# Patient Record
(demographics unavailable — no encounter records)

---

## 2024-10-11 NOTE — ASSESSMENT
[FreeTextEntry1] : Ms. NOEMI BOLAÑOS is a 66 year F with pain in the lower back across with radiation down the buttock She reports chronic long standing pain and is noting an acute on chronic exacerbation of this pain due to lumbar radiculopathy. However, her predominant pain is across the lower back due to sacroiliac joint pain. There ARE +myelopathic signs on today's exam.  Patient reassured and educated on the diagnosis and treatment options. Risks and benefits of treatment and of delaying treatment discussed with patient. Risks discussed include but not limited to: progression of symptoms, worsening pain and functional status, etc.  This note was generated using Dragon medical dictation software. A reasonable effort had been made for proofreading its contents, but spelling mistakes or grammatical errors may still remain. If there are any questions or points of clarification needed please notify my office.  Dr. Valdes's note reviewed in HPI MRI L spine from 2023 reviewed  Spine surgery evaluation due to myelopathic signs on physical exam and extensive falling history  Patient is being sent and I am requesting authorization for MRI w/o contrast of CERVICAL AND LUMBAR SPINE to evaluate for nerve compression, stenosis, degenerative disease, soft tissue injury or other new or worsening etiology of pain. Patient has myelopathic signs on physical exam and extensive falling history  Patient had tried and failed conservative treatment. This includes but is not limited to: Acetaminophen, NSAIDs, muscle relaxants, neuropathic medications, physician directed home exercises and/or physical therapy for at least 8 weeks. After a thorough discussion of risks and benefits patient would like to proceed with BILATERAL SACROILIAC JOINT STEROID INJECTION WITH FLUOROSCOPIC GUIDANCE  Risks and benefits of having injection discussed with patient. Risks discussed included, but not limited to: pain, infection, bleeding requiring emergency surgery, headaches, damage to vital organs, etc.  At this time, patient appears to be psychologically stable. Based on the history, physical exam and diagnostic findings, patient will benefit from this procedure. The goal of this procedure is to reduce pain and inflammation, improve function and range of motion and to further promote increased activity and return to previous level of functioning. The ultimate goal of performing this procedure is to improve patient's quality of life.  Asking for authorization for BILATERAL SACROILIAC JOINT STEROID INJECTION WITH FLUOROSCOPIC GUIDANCE to help treat patients pain.  Patient will be scheduled for this procedure.

## 2024-10-11 NOTE — HISTORY OF PRESENT ILLNESS
[FreeTextEntry1] : NOEMI BOLAÑOS is an 66 year old F here for initial evaluation of BACK PAIN. Ms. BOLAÑOS was being treated by Dr. Valdes and was planning to have a lumbar CHEL. However, patient preferred to be treated in the afternoon and reports that this is not offered to her. She is here today expecting to get an epidural.  From Dr. Valdes's note on 9/10/24: "Patient returns after the first LESI a few weeks ago. The patient reports no significant improvement in the symptoms. The patient returns for a follow up visit today.....66 year old female with low back pain and lumbar radicular pain not significantly improved following her first LESI. We did discuss a repeat lumbar epidural steroid injection"  NOEMI BOLAÑOS reports an increased frequency of falling over the past few months. She notes having a fall at least once a week. Denies any LOC or recent injuries, but does note a "bruise on the LEFT knee." She feels like her legs give out or she stumbles and trips.  Here PCP and all her usual physicians are at [facility], where she had MRI L spine done a year ago. Today reporting majority of pain across the lower back. She is annoyed that she was requested to come in for evaluation with me in the office. She wanted for me to do just her epidural injection. She is a retired .   Pain location: lower back Quality: sharp, stabbing Radiation: across the lower back and down the buttocks Severity: 10/10 on NRS Onset: over 3 months ago Associated symptoms: stiffness, spasms Numbness: denies Weakness: admits to high levels of falling Exacerbated by: walking, standing Improved by: nothing Bowel or bladder involvement: denies   The pain interferes with function, ADLs and quality of life.   Denies bowel/bladder dysfunction, saddle anesthesia, fevers, chills, weight loss, night pain, or night sweats at this time.  Patient had tried Acetaminophen, NSAIDs, prescription pain medications, muscle relaxants without any lasting relief of pain.   Patient had imaging studies to evaluate the pain. Patient had tried physical therapy, and/or physician directed home exercises, stretching, lifestyle modification for over 8 weeks without any significant relief.

## 2024-10-11 NOTE — PHYSICAL EXAM
[FreeTextEntry1] : General exam   Constitutional: The patient appears well-developed, well-nourished, and in no apparent distress. Patient is well-groomed.    Skin: The skin is warm and dry, with normal turgor.  Eyes: PERRL.    ENMT: Ears: Hearing is grossly within normal limits.    Neck: Supple: The neck is supple.    Respiratory: Inspection: Breathing unlabored.    Neurologic: Alert and oriented x 3.   Psychiatric: Patient is cooperative and appropriate.  Mood and affect are normal.  Patient's insight is good, and memory and judgment are intact.  CERVICAL EXAM  APPEARANCE: Flexed forward posture  Arthritic appearing hand nodules  REFLEXES: Pitt sign left + Pitt sign right +  Biceps (C5) left 3+ Biceps (C5) right 3+  LUMBAR EXAM   APPEARANCE: No visible scars No gross deformity or malalignment No erythema, swelling or ecchymosis Decreased lumbar lordosis +muscle atrophy of the left lower extremity +muscle atrophy of the right lower extremity   TENDERNESS: Multiple trigger points over bilateral lumbar paraspinal muscles neg over midline spinous processes + over left lumbar facet joints + over right lumbar facet joints + over left lumbar paraspinal muscles: erector spinae and quadratus lumborum + over right lumbar paraspinal muscles: erector spinae and quadratus lumborum + over left sacroiliac joint + over right sacroiliac joint   ROM: Pain limited A/PROM of the lumbar spine + pain with flexion, extension of the lumbar spine + pain with left side bending + pain with right side bending + pain with left rotation + pain with right rotation + hamstring tightness on the left + hamstring tightness on the right   SPECIAL TESTS: + left straight leg raising test + right straight leg raising test FABERE left + FABERE right + FADIR left + FADIR right + +Distraction b/l   SENSORY TESTING: Intact to light touch Left L1-S2 : Intact to light touch Right L1-S2 :   MOTOR TESTING: Muscle tone of the left lower extremity is normal Muscle tone of the right lower extremity is normal   Left hip flexion strength is 5/5 Right hip flexion strength is 5/5   Left quadriceps strength is 5/5 Right quadriceps strength is 5/5   Left hamstrings strength is 5/5 Right hamstrings strength is 5/5   Left EHL strength is 5/5 Right EHL strength is 5/5   Left ankle dorsiflexion strength is 5/5 Right ankle dorsiflexion strength is 5/5   Left ankle plantar flexion strength is 5/5 Right ankle plantar flexion strength is 5/5   REFLEXES: Patella (L4) left 3+ Patella (L4) right 3+ Left knee with infrapatellar ecchymosis, resolving and minor scrape   GAIT: +antalgic gait able to walk on toes able to walk on heels Flexed forward posture

## 2024-10-11 NOTE — HISTORY OF PRESENT ILLNESS
[FreeTextEntry1] : NOEMI BOLAÑOS is an 66 year old F here for initial evaluation of BACK PAIN. Ms. BOLAÑOS was being treated by Dr. Valdes and was planning to have a lumbar CHEL. However, patient preferred to be treated in the afternoon and reports that this is not offered to her. She is here today expecting to get an epidural.  From Dr. Valdes's note on 9/10/24: "Patient returns after the first LESI a few weeks ago. The patient reports no significant improvement in the symptoms. The patient returns for a follow up visit today.....66 year old female with low back pain and lumbar radicular pain not significantly improved following her first LESI. We did discuss a repeat lumbar epidural steroid injection"  NOEMI BOLAÑOS reports an increased frequency of falling over the past few months. She notes having a fall at least once a week. Denies any LOC or recent injuries, but does note a "bruise on the LEFT knee." She feels like her legs give out or she stumbles and trips.  Here PCP and all her usual physicians are at [facility], where she had MRI L spine done a year ago. Today reporting majority of pain across the lower back. She is annoyed that she was requested to come in for evaluation with me in the office. She wanted for me to do just her epidural injection. She is a retired .   Pain location: lower back Quality: sharp, stabbing Radiation: across the lower back and down the buttocks Severity: 10/10 on NRS Onset: over 3 months ago Associated symptoms: stiffness, spasms Numbness: denies Weakness: admits to high levels of falling Exacerbated by: walking, standing Improved by: nothing Bowel or bladder involvement: denies   The pain interferes with function, ADLs and quality of life.   Denies bowel/bladder dysfunction, saddle anesthesia, fevers, chills, weight loss, night pain, or night sweats at this time.  Patient had tried Acetaminophen, NSAIDs, prescription pain medications, muscle relaxants without any lasting relief of pain.   Patient had imaging studies to evaluate the pain. Patient had tried physical therapy, and/or physician directed home exercises, stretching, lifestyle modification for over 8 weeks without any significant relief.

## 2024-10-25 NOTE — PROCEDURE
[de-identified] : [facility] [address], [place], [address] - [phone]   PATIENT: NOEMI BOLAÑOS MEDICAL RECORD: [id number] DATE OF PROCEDURE: 10/25/2024   PHYSICIAN: Nasim Del Toro DO  BILATERAL SACROILIAC JOINT INTRAARTICULAR STEROID INJECTION WITH FLUOROSCOPIC GUIDANCE   Injectate: 80mg/mL methylPrednisolone and 0.25% Bupivacaine 4mL   Complications: None   Estimated Blood Loss: None   SACROILIAC JOINT INTRAARTICULAR STEROID INJECTION WITH FLUOROSCOPIC GUIDANCE   Technique: After informed consent was obtained, patient was taken to the operating room and placed on the table in the prone position. All pressure points were supported. The lumbosacral area was then exposed and prepped with betadine x3 followed by chlorhexidine and draped in a standard sterile manner. The entirety of the procedure was done under sterile technique using sterile gloves, surgical mask and cap and all medication expiration dates were verified prior to being drawn into syringes.   Utilizing AP fluoroscopy with a slight cephalad tilt and contralateral oblique projection. The right sacroiliac joint was visualized. Following this, 3 mL of preservative free 1% lidocaine was inserted for a skin wheal overlying the inferior aspect of the joint. Following this, a 22G 3-1/2 inch spinal needle was injected through the skin wheal until engagement with inferior articular surface. 1 mL Omnipaque 240 contrast was then injected to delineate articular spread.   After negative aspiration for heme, the entirety of the above mentioned injectate was then injected to the right sacroiliac joint.   The procedure was repeated on LEFT SIJ   The patient tolerated the entirety of the procedure well and was taken to phase 2 recovery in stable condition with bilateral lower extremity motor and sensory intact.

## 2024-12-12 NOTE — ASSESSMENT
[FreeTextEntry1] : Ms. NOEMI BOLAÑOS is a 66 year F with pain in the lower back across with radiation down the buttock due to lumbar radiculopathy. There ARE +myelopathic signs on exam.  Patient reassured and educated on the diagnosis and treatment options. Risks and benefits of treatment and of delaying treatment discussed with patient. Risks discussed include but not limited to: progression of symptoms, worsening pain and functional status, etc.  This note was generated using Dragon medical dictation software. A reasonable effort had been made for proofreading its contents, but spelling mistakes or grammatical errors may still remain. If there are any questions or points of clarification needed please notify my office.  MRI L spine from 2023 reviewed  Spine surgery evaluation due to myelopathic signs on physical exam and extensive falling history  Patient is being sent and I am requesting authorization for MRI w/o contrast of CERVICAL AND LUMBAR SPINE to evaluate for nerve compression, stenosis, degenerative disease, soft tissue injury or other new or worsening etiology of pain. Patient has myelopathic signs on physical exam and extensive falling history  Patient had tried and failed conservative treatment. This includes but is not limited to: Acetaminophen, NSAIDs, muscle relaxants, neuropathic medications, physician directed home exercises and/or physical therapy for at least 8 weeks. After a thorough discussion of risks and benefits patient would like to proceed with LUMBAR INTERLAMINAR EPIDURAL STEROID INJECTION AT LEFT L5-S1 WITH FLUOROSCOPIC GUIDANCE  Risks and benefits of having injection discussed with patient. Risks discussed included, but not limited to: pain, infection, bleeding requiring emergency surgery, headaches, damage to vital organs, etc.  At this time, patient appears to be psychologically stable. Based on the history, physical exam and diagnostic findings, patient will benefit from this procedure. The goal of this procedure is to reduce pain and inflammation, improve function and range of motion and to further promote increased activity and return to previous level of functioning. The ultimate goal of performing this procedure is to improve patient's quality of life.  Asking for authorization for LUMBAR INTERLAMINAR EPIDURAL STEROID INJECTION AT LEFT L5-S1 WITH FLUOROSCOPIC GUIDANCE to help treat patients pain. Patient will be scheduled for this procedure.

## 2024-12-12 NOTE — PHYSICAL EXAM
[FreeTextEntry1] : General exam   Constitutional: The patient appears well-developed, well-nourished, and in no apparent distress. Patient is well-groomed.    Skin: The skin is warm and dry, with normal turgor.  Eyes: PERRL.    ENMT: Ears: Hearing is grossly within normal limits.    Neck: Supple: The neck is supple.    Respiratory: Inspection: Breathing unlabored.    Neurologic: Alert and oriented x 3.   Psychiatric: Patient is cooperative and appropriate.  Mood and affect are normal.  Patient's insight is good, and memory and judgment are intact.  Lumbar Skin c/d/i without any erythema, swelling, effusion  Diffuse tenderness Multiple trigger points SLR + b/l FABERE/FADIR + on the LEFT Antalgic, flexed forward gait Motor grossly intact

## 2024-12-12 NOTE — HISTORY OF PRESENT ILLNESS
[FreeTextEntry1] : NOEMI BOLAÑOS had BILATERAL SACROILIAC JOINT INTRAARTICULAR STEROID INJECTION WITH FLUOROSCOPIC GUIDANCE on 10/25/24. Today patient is here for follow up. Patient reports no relief of her pains. Pain is 10/10 on NRS at this time. It is across the lower back, worse on the LEFT and travel down the buttocks and thigh. He had not done an updated MRI C and L spine yet.   Denies any new pain, numbness or weakness, bowel/bladder dysfunction, saddle anesthesia, fevers, chills, weight loss, night pain, or night sweats at this time.

## 2025-01-06 NOTE — DISCUSSION/SUMMARY
[Medication Risks Reviewed] : Medication risks reviewed [Surgical risks reviewed] : Surgical risks reviewed [de-identified] : L4-5 spondylolisthesis with moderate stenosis. Cervical degenerative disc disease. Discussed all options. Will see Dr. Del Toro for pain management.  F/U 4 weeks. Discussed L4-5 laminectomy and fusion. Risks of surgery include infection, dural tear, nerve root injury, reherniation, future leg pain, future back pain, retained fragment, hematoma, urinary retention, worsening leg symptoms, foot drop, anesthetic risks, blood transfusion risks, positioning pain, visceral vascular injury, deep vein thrombosis, pulmonary embolus, and death. Somatosensory evoked potentials and EMG monitoring will be used. Surgery will involve lumbar decompression and fusion with or without instrumentation, local auto bone fusion, demineralized bone matrix, and neural monitoring. Patient will need spinal orthosis pre and post operatively. All risks were explained not exclusive to the ones mentioned alternatives were discussed and all questions were answered the patient agrees and understands the above and is in complete agreement with the plan. All options discussed including rest, medicine, home exercise, acupuncture, Chiropractic care, Physical Therapy, Pain management, and last resort surgery. All questions were answered, all alternatives discussed, and the patient is in complete agreement with the treatment plan which the patient contributed to and discussed with me through the shared decision-making process. Follow-up appointment as instructed. Any issues and the patient will call or come in sooner.

## 2025-01-06 NOTE — PHYSICAL EXAM
[Normal] : Gait: normal [Pitt's Sign] : negative Pitt's sign [Pronator Drift] : negative pronator drift [SLR] : negative straight leg raise [de-identified] : 5 out of 5 motor strength, sensation is intact and symmetrical full range of motion flexion extension and rotation, no palpatory tenderness full range of motion of hips knees shoulders and elbows (all four extremities), no atrophy, negative straight leg raise, no pathological reflexes, no swelling, normal ambulation, no apparent distress skin is intact, no palpable lymph nodes, no upper or lower extremity instability, alert and oriented x3 and normal mood. Normal finger-to nose test.  No upper motor neuron findings. [de-identified] : I reviewed, interpreted and clinically correlated the following outside imaging studies, EXAM: [id number] - MR SPINE LUMBAR  - ORDERED BY: THEODORE KILGORE   PROCEDURE DATE:  01/02/2025    INTERPRETATION:  CLINICAL INFORMATION: Low back pain  ADDITIONAL CLINICAL INFORMATION: Not Applicable  TECHNIQUE: Multiplanar, multisequence MRI was performed of the lumbar spine. IV Contrast: NONE  PRIOR STUDIES: No priors available for comparison.  FINDINGS:  BONES: Lipid poor hemangioma at L2. Mild patchy diffuse marrow signal hypointensity which may be red marrow reconversion or other marrow replacing process. Marrow edema at the left L5 pedicle and extending into the left L4-L5 facet joint. ALIGNMENT: Grade I anterolisthesis of L4 and L5. SACROILIAC JOINTS/SACRUM: There is no sacral fracture. The SI joints are partially visualized but are intact. CONUS AND CAUDA EQUINA: The distal cord and conus are normal in signal. Conus terminates at L1. VISUALIZED INTRAPELVIC/INTRA-ABDOMINAL SOFT TISSUES: Normal. PARASPINAL SOFT TISSUES: Paraspinal muscle fatty atrophy distally.   INDIVIDUAL LEVELS: Multilevel loss of disc heights and disc signal.  LOWER THORACIC SPINE: Mild disc bulge T12-L1 mildly narrowing the neuroforamina.  L1-L2: Mild bilateral facet arthrosis. Mild bilateral foraminal narrowing. L2-L3: Mild disc bulge. Mild to moderate bilateral foraminal narrowing. L3-L4: Diffuse disc bulge. Bilateral facet arthrosis. Mild spinal canal stenosis. Mild narrowing of the right lateral recess. Mild to moderate right greater than left foraminal narrowing. L4-L5: Grade I anterolisthesis with disc uncovering. Diffuse disc bulge. Small right foraminal annular fissure. Bilateral facet arthrosis with effusions. Ligamentum flavum thickening. Moderate spinal canal stenosis. Mild to moderate bilateral foraminal narrowing. L5-S1: Small right foraminal protrusion. Bulky right greater than left facet arthrosis. Mild narrowing of the right lateral recess with possible contact on the right S1 descending nerve.   IMPRESSION:  Mild to moderate multilevel lumbar spondylosis, most notably at L4-L5 where there is grade I anterolisthesis, moderate spinal canal stenosis and mild-to-moderate bilateral foraminal narrowing.  --- End of Report ---  EXAM: [id number] - MR SPINE CERVICAL  - ORDERED BY: THEODORE KILGORE   PROCEDURE DATE:  01/02/2025    INTERPRETATION:  MR CERVICAL SPINE  CLINICAL INFORMATION: eval for spinal compression; TECHNIQUE: MR CERVICAL SPINE COMPARISON: None available  FINDINGS:  DISC LEVEL EVALUATION:  C1/C2: Dorsal ligamentous thickening which mildly indents the ventral thecal sac without central canal narrowing. C2/C3: Mild right facet arthrosis. Small central protrusion which indents the ventral thecal sac. C3/C4: Mild disc bulge with osseous ridging which mildly indents the ventral thecal sac. Mild narrowing of the left lateral recess. Mild left facet arthrosis. Moderate left foraminal narrowing. C4/C5: Mild disc bulge. Bulky right facet arthrosis. Moderate right greater than left foraminal narrowing. C5/C6: Mild disc bulge which indents the ventral thecal sac. Mild to moderate bilateral foraminal narrowing. C6/C7: Disc bulge asymmetric to the right. Moderate right foraminal narrowing. C7/T1: Normal.  ALIGNMENT: Exaggerated cervical lordosis. Mild retrolisthesis of C3 on C4. Mild anterolisthesis of C4 on C5. CORD: No cord signal abnormality. MARROW: No fracture. Mild patchy diffuse marrow signal hypointensity which may be red marrow conversion. DISCS: Moderate multilevel loss of disc heights and disc signal. IMAGED BRAIN: Normal. PERIPHERAL/NECK SOFT TISSUES: Subcentimeter nodule in the left lobe of the thyroid gland.  IMPRESSION:  *  Exaggerated cervical lordosis with multilevel cervical spondylosis with multilevel mild to moderate spinal canal and varying foraminal narrowing. *  No cord signal abnormality.  --- End of Report ---

## 2025-01-06 NOTE — HISTORY OF PRESENT ILLNESS
[Stable] : stable [de-identified] : 66 year old female presents for initial evaluation of lower back pain with radiation down the LLE x 2-3 years. Denies injury. The pain radiates down the LLE laterally to the knee. Denies numbness/tingling. Denies weakness. Bending and walking aggravates the pain.  She takes gabapentin for the pain. Has tried PT about 1 year ago which did not help. S/P LESI x 2 on 8/12/24 with Dr. Valdes and on 10/25/24 with Dr. Del Toro but did not feel relief.  Has MRI Lumbar.  PMHx: HTN, COPD No fever, chills, sweats, nausea/vomiting. No bowel or bladder dysfunction, no recent weight loss or gain. No night pain. This history is in addition to the intake form that I personally reviewed.

## 2025-01-06 NOTE — ADDENDUM
[FreeTextEntry1] : This note was written by Quan Mccallum on 01/06/2025 acting as scribe for Dr. Jabier Arrington M.D.  I, Jabier Arrington MD, have read and attest that all the information, medical decision making and discharge instructions within are true and accurate.

## 2025-01-17 NOTE — HISTORY OF PRESENT ILLNESS
[FreeTextEntry1] : [facility] [address], [place], [address] - [phone]   PATIENT: NOEMI BOLAÑOS MEDICAL RECORD: [id number] DATE OF PROCEDURE: 01/17/2025   PHYSICIAN: Nasim Del Toro DO  LUMBAR INTERLAMINAR EPIDURAL STEROID INJECTION WITH FLUOROSCOPIC GUIDANCE  Levels injected: LEFT L5-S1  Injectate: 80mg/mL methylPrednisolone and 0.25% Bupivacaine 1mL and 3mL Normal Saline Preservative Free  Complications: None  Estimated Blood Loss: None   Technique: After informed consent was obtained, the patient was taken to the operating room and placed in the prone position. All pressure points were supported. The lumbar region was then exposed and prepped with chlorhexidine and draped in a sterile manner. The entirety of the procedure was done under sterile technique using sterile gloves, surgical mask and cap and all medication expiration dates were verified prior to being drawn into syringes.   Using AP fluoroscopy, the L5-S1 interspace was identified. Following this, a skin wheal was created using a 25-gauge 1-1/2 inch needle and 3 mL of preservative free 1% lidocaine. Through this skin wheal, a 20-gauge tuohy epidural needle was advanced under intermittent fluoroscopy until engagement with ligamentum flavum. At this point, a loss of resistance syringe filled with sterile saline was attached to the epidural needle and was used to gain access to the epidural space under intermittent AP/lateral fluoroscopy. After negative aspiration of heme and CSF, 1 mL of Omnipaque 240 contrast was injected to delineate epidural spread under fluoroscopic view. Following this, again after negative aspiration for heme and CSF, the entirety of the above mentioned injectate was easily injected to the L5-S1 epidural space without paresthesias or complications.   The patient tolerated the procedure well and was taken to phase II recovery room in stable condition with bilateral lower extremity motor and sensation intact.

## 2025-01-17 NOTE — PROCEDURE
[de-identified] : [facility] [address], [place], [address] - [phone]   PATIENT: NOEMI BOLAÑOS MEDICAL RECORD: [id number] DATE OF PROCEDURE: 01/17/2025   PHYSICIAN: Nasim Del Toro DO  LUMBAR INTERLAMINAR EPIDURAL STEROID INJECTION WITH FLUOROSCOPIC GUIDANCE  Levels injected: LEFT L5-S1  Injectate: 80mg/mL methylPrednisolone and 0.25% Bupivacaine 1mL and 3mL Normal Saline Preservative Free  Complications: None  Estimated Blood Loss: None   Technique: After informed consent was obtained, the patient was taken to the operating room and placed in the prone position. All pressure points were supported. The lumbar region was then exposed and prepped with chlorhexidine and draped in a sterile manner. The entirety of the procedure was done under sterile technique using sterile gloves, surgical mask and cap and all medication expiration dates were verified prior to being drawn into syringes.   Using AP fluoroscopy, the L5-S1 interspace was identified. Following this, a skin wheal was created using a 25-gauge 1-1/2 inch needle and 3 mL of preservative free 1% lidocaine. Through this skin wheal, a 20-gauge tuohy epidural needle was advanced under intermittent fluoroscopy until engagement with ligamentum flavum. At this point, a loss of resistance syringe filled with sterile saline was attached to the epidural needle and was used to gain access to the epidural space under intermittent AP/lateral fluoroscopy. After negative aspiration of heme and CSF, 1 mL of Omnipaque 240 contrast was injected to delineate epidural spread under fluoroscopic view. Following this, again after negative aspiration for heme and CSF, the entirety of the above mentioned injectate was easily injected to the L5-S1 epidural space without paresthesias or complications.   The patient tolerated the procedure well and was taken to phase II recovery room in stable condition with bilateral lower extremity motor and sensation intact.

## 2025-01-17 NOTE — HISTORY OF PRESENT ILLNESS
[FreeTextEntry1] : [facility] [address], [place], [address] - [phone]   PATIENT: NOEMI BOLAÑOS MEDICAL RECORD: [id number] DATE OF PROCEDURE: 01/17/2025   PHYSICIAN: Nasim Del Toro DO  LUMBAR INTERLAMINAR EPIDURAL STEROID INJECTION WITH FLUOROSCOPIC GUIDANCE  Levels injected: LEFT L5-S1  Injectate: 80mg/mL methylPrednisolone and 0.25% Bupivacaine 1mL and 3mL Normal Saline Preservative Free  Complications: None  Estimated Blood Loss: None   Technique: After informed consent was obtained, the patient was taken to the operating room and placed in the prone position. All pressure points were supported. The lumbar region was then exposed and prepped with chlorhexidine and draped in a sterile manner. The entirety of the procedure was done under sterile technique using sterile gloves, surgical mask and cap and all medication expiration dates were verified prior to being drawn into syringes.   Using AP fluoroscopy, the L5-S1 interspace was identified. Following this, a skin wheal was created using a 25-gauge 1-1/2 inch needle and 3 mL of preservative free 1% lidocaine. Through this skin wheal, a 20-gauge tuohy epidural needle was advanced under intermittent fluoroscopy until engagement with ligamentum flavum. At this point, a loss of resistance syringe filled with sterile saline was attached to the epidural needle and was used to gain access to the epidural space under intermittent AP/lateral fluoroscopy. After negative aspiration of heme and CSF, 1 mL of Omnipaque 240 contrast was injected to delineate epidural spread under fluoroscopic view. Following this, again after negative aspiration for heme and CSF, the entirety of the above mentioned injectate was easily injected to the L5-S1 epidural space without paresthesias or complications.   The patient tolerated the procedure well and was taken to phase II recovery room in stable condition with bilateral lower extremity motor and sensation intact.

## 2025-01-17 NOTE — PROCEDURE
[de-identified] : [facility] [address], [place], [address] - [phone]   PATIENT: NOEMI BOLAÑOS MEDICAL RECORD: [id number] DATE OF PROCEDURE: 01/17/2025   PHYSICIAN: Nasim Del Toro DO  LUMBAR INTERLAMINAR EPIDURAL STEROID INJECTION WITH FLUOROSCOPIC GUIDANCE  Levels injected: LEFT L5-S1  Injectate: 80mg/mL methylPrednisolone and 0.25% Bupivacaine 1mL and 3mL Normal Saline Preservative Free  Complications: None  Estimated Blood Loss: None   Technique: After informed consent was obtained, the patient was taken to the operating room and placed in the prone position. All pressure points were supported. The lumbar region was then exposed and prepped with chlorhexidine and draped in a sterile manner. The entirety of the procedure was done under sterile technique using sterile gloves, surgical mask and cap and all medication expiration dates were verified prior to being drawn into syringes.   Using AP fluoroscopy, the L5-S1 interspace was identified. Following this, a skin wheal was created using a 25-gauge 1-1/2 inch needle and 3 mL of preservative free 1% lidocaine. Through this skin wheal, a 20-gauge tuohy epidural needle was advanced under intermittent fluoroscopy until engagement with ligamentum flavum. At this point, a loss of resistance syringe filled with sterile saline was attached to the epidural needle and was used to gain access to the epidural space under intermittent AP/lateral fluoroscopy. After negative aspiration of heme and CSF, 1 mL of Omnipaque 240 contrast was injected to delineate epidural spread under fluoroscopic view. Following this, again after negative aspiration for heme and CSF, the entirety of the above mentioned injectate was easily injected to the L5-S1 epidural space without paresthesias or complications.   The patient tolerated the procedure well and was taken to phase II recovery room in stable condition with bilateral lower extremity motor and sensation intact.

## 2025-02-06 NOTE — ASSESSMENT
[FreeTextEntry1] : Ms. NOEMI BOLAÑOS is a 66 year F with pain in the lower back across with radiation down the buttock due to lumbar radiculopathy. There ARE +myelopathic signs on exam.  Patient reassured and educated on the diagnosis and treatment options. Risks and benefits of treatment and of delaying treatment discussed with patient. Risks discussed include but not limited to: progression of symptoms, worsening pain and functional status, etc.  This note was generated using Dragon medical dictation software. A reasonable effort had been made for proofreading its contents, but spelling mistakes or grammatical errors may still remain. If there are any questions or points of clarification needed please notify my office.  MRI L spine from 2023 and 2025 reviewed MRI C spine reviewed Dr. Arrington' note reviewed in HPI  Start Medrol 4mg dose pack #21 tablets. Patient directed to follow directions on the blister pack and to complete the entire treatment course. Advised not to take any NSAIDs during of treatment. Denies CKD, CAD, or gastritis. Recommend that if patient develops GI symptoms including abdominal pain, nausea, or vomiting to discontinue use of medication immediately.  Start Tizanidine 2mg 1-2 tablets PO qHS PRN pain, dispense #30. Rx sent. Patient denies any liver problems. Monitor LFTs. Patient warned about the sedative nature of this medication and recommended not to drive or to handle heavy machinery.  Start Naproxen 500 mg PO BID x 30 days PRN pain with food #60. Denies CKD, CAD, or gastritis. Recommend that if patient develops GI symptoms including abdominal pain, nausea, or vomiting to discontinue use of medication immediately.  Consider Trial of Indomethacin  Sending patient for PT for BACK PAIN to help relieve pain and improve function. Stretching, strengthening, ROM, home education and other appropriate interventions. Precautions include fall prevention.  Follow up 1-2 months Advising patient that I am leaving [facility] for another position in the near future. Advising to follow up with our PM&R team for continuity of care. Patient expresses understanding and is grateful for her care by me.  Patient was advised if the following symptoms develop: chills, fever, loss of bladder control, bowel incontinence or urinary retention, numbness/tingling or weakness is present in upper or lower extremities, to go to the nearest emergency room. This may be a new clinical condition not present at the time of the patient visit that may lead to paralysis and/or death. Patient advised if the above symptoms developed to also call the office immediately to inform us and to go to the nearest emergency room.

## 2025-02-06 NOTE — HISTORY OF PRESENT ILLNESS
[FreeTextEntry1] : NOEMI BOLAÑOS had LUMBAR INTERLAMINAR EPIDURAL STEROID INJECTION WITH FLUOROSCOPIC GUIDANCE at LEFT L5-S1 on 1/17/25. Today patient is here for follow up. Patient reports around 50% reduction in pain as tested by the NRS pain scale (Pain went down from 10/10 on NRS prior to injection). However she is still feeling pain and reports it as being over 5/10 on NRS at this time. She does appear in better spirits today. She is reading [employer] and has her hair done for her birthday, which is tomorrow. She had seen ortho spine Dr. Arrington on 1/6/25: "Medication risks reviewed. Surgical risks reviewed. L4-5 spondylolisthesis with moderate stenosis. Cervical degenerative disc disease. Discussed all options. Will see Dr. Del Toro for pain management."  She wishes to hold off surgery at this time and is asking about other treatment options.   Denies any new pain, numbness or weakness, bowel/bladder dysfunction, saddle anesthesia, fevers, chills, weight loss, night pain, or night sweats at this time.

## 2025-02-06 NOTE — PHYSICAL EXAM
[FreeTextEntry1] : General exam   Constitutional: The patient appears well-developed, well-nourished, and in no apparent distress. Patient is well-groomed.    Skin: The skin is warm and dry, with normal turgor.  Eyes: PERRL.    ENMT: Ears: Hearing is grossly within normal limits.    Neck: Supple: The neck is supple.    Respiratory: Inspection: Breathing unlabored.    Neurologic: Alert and oriented x 3.   Psychiatric: Patient is cooperative and appropriate.  Mood and affect are normal.  Patient's insight is good, and memory and judgment are intact.  Lumbar Skin c/d/i without any erythema, swelling, effusion or tenderness Flexed forward gait SLR neg b/l Motor grossly intact

## 2025-02-06 NOTE — HISTORY OF PRESENT ILLNESS
[FreeTextEntry1] : NOEMI BOLAÑOS had LUMBAR INTERLAMINAR EPIDURAL STEROID INJECTION WITH FLUOROSCOPIC GUIDANCE at LEFT L5-S1 on 1/17/25. Today patient is here for follow up. Patient reports around 50% reduction in pain as tested by the NRS pain scale (Pain went down from 10/10 on NRS prior to injection). However she is still feeling pain and reports it as being over 5/10 on NRS at this time. She does appear in better spirits today. She is reading [email or web address] and has her hair done for her birthday, which is tomorrow. She had seen ortho spine Dr. Arrington on 1/6/25: "Medication risks reviewed. Surgical risks reviewed. L4-5 spondylolisthesis with moderate stenosis. Cervical degenerative disc disease. Discussed all options. Will see Dr. Del Toro for pain management."  She wishes to hold off surgery at this time and is asking about other treatment options.   Denies any new pain, numbness or weakness, bowel/bladder dysfunction, saddle anesthesia, fevers, chills, weight loss, night pain, or night sweats at this time.

## 2025-02-06 NOTE — ASSESSMENT
[FreeTextEntry1] : Ms. NOEMI BOLAÑOS is a 66 year F with pain in the lower back across with radiation down the buttock due to lumbar radiculopathy. There ARE +myelopathic signs on exam.  Patient reassured and educated on the diagnosis and treatment options. Risks and benefits of treatment and of delaying treatment discussed with patient. Risks discussed include but not limited to: progression of symptoms, worsening pain and functional status, etc.  This note was generated using Dragon medical dictation software. A reasonable effort had been made for proofreading its contents, but spelling mistakes or grammatical errors may still remain. If there are any questions or points of clarification needed please notify my office.  MRI L spine from 2023 and 2025 reviewed MRI C spine reviewed Dr. Arrington' note reviewed in HPI  Start Medrol 4mg dose pack #21 tablets. Patient directed to follow directions on the blister pack and to complete the entire treatment course. Advised not to take any NSAIDs during of treatment. Denies CKD, CAD, or gastritis. Recommend that if patient develops GI symptoms including abdominal pain, nausea, or vomiting to discontinue use of medication immediately.  Start Tizanidine 2mg 1-2 tablets PO qHS PRN pain, dispense #30. Rx sent. Patient denies any liver problems. Monitor LFTs. Patient warned about the sedative nature of this medication and recommended not to drive or to handle heavy machinery.  Start Naproxen 500 mg PO BID x 30 days PRN pain with food #60. Denies CKD, CAD, or gastritis. Recommend that if patient develops GI symptoms including abdominal pain, nausea, or vomiting to discontinue use of medication immediately.  Consider Trial of Indomethacin  Sending patient for PT for BACK PAIN to help relieve pain and improve function. Stretching, strengthening, ROM, home education and other appropriate interventions. Precautions include fall prevention.  Follow up 1-2 months Advising patient that I am leaving [facility] for another position in the near future. Advising to follow up with our PM&R team for continuity of care. Patient expresses understanding and is grateful for her care by me.  Patient was advised if the following symptoms develop: chills, fever, loss of bladder control, bowel incontinence or urinary retention, numbness/tingling or weakness is present in upper or lower extremities, to go to the nearest emergency room. This may be a new clinical condition not present at the time of the patient visit that may lead to paralysis and/or death. Patient advised if the above symptoms developed to also call the office immediately to inform us and to go to the nearest emergency room.

## 2025-02-10 NOTE — PHYSICAL EXAM
[Normal] : Gait: normal [Pitt's Sign] : negative Pitt's sign [Pronator Drift] : negative pronator drift [SLR] : negative straight leg raise [de-identified] : 5 out of 5 motor strength, sensation is intact and symmetrical full range of motion flexion extension and rotation, no palpatory tenderness full range of motion of hips knees shoulders and elbows (all four extremities), no atrophy, negative straight leg raise, no pathological reflexes, no swelling, normal ambulation, no apparent distress skin is intact, no palpable lymph nodes, no upper or lower extremity instability, alert and oriented x3 and normal mood. Normal finger-to nose test.  No upper motor neuron findings. [de-identified] : I reviewed, interpreted and clinically correlated the following outside imaging studies, EXAM: [id number] - MR SPINE LUMBAR  - ORDERED BY: THEODORE KILGORE   PROCEDURE DATE:  01/02/2025    INTERPRETATION:  CLINICAL INFORMATION: Low back pain  ADDITIONAL CLINICAL INFORMATION: Not Applicable  TECHNIQUE: Multiplanar, multisequence MRI was performed of the lumbar spine. IV Contrast: NONE  PRIOR STUDIES: No priors available for comparison.  FINDINGS:  BONES: Lipid poor hemangioma at L2. Mild patchy diffuse marrow signal hypointensity which may be red marrow reconversion or other marrow replacing process. Marrow edema at the left L5 pedicle and extending into the left L4-L5 facet joint. ALIGNMENT: Grade I anterolisthesis of L4 and L5. SACROILIAC JOINTS/SACRUM: There is no sacral fracture. The SI joints are partially visualized but are intact. CONUS AND CAUDA EQUINA: The distal cord and conus are normal in signal. Conus terminates at L1. VISUALIZED INTRAPELVIC/INTRA-ABDOMINAL SOFT TISSUES: Normal. PARASPINAL SOFT TISSUES: Paraspinal muscle fatty atrophy distally.   INDIVIDUAL LEVELS: Multilevel loss of disc heights and disc signal.  LOWER THORACIC SPINE: Mild disc bulge T12-L1 mildly narrowing the neuroforamina.  L1-L2: Mild bilateral facet arthrosis. Mild bilateral foraminal narrowing. L2-L3: Mild disc bulge. Mild to moderate bilateral foraminal narrowing. L3-L4: Diffuse disc bulge. Bilateral facet arthrosis. Mild spinal canal stenosis. Mild narrowing of the right lateral recess. Mild to moderate right greater than left foraminal narrowing. L4-L5: Grade I anterolisthesis with disc uncovering. Diffuse disc bulge. Small right foraminal annular fissure. Bilateral facet arthrosis with effusions. Ligamentum flavum thickening. Moderate spinal canal stenosis. Mild to moderate bilateral foraminal narrowing. L5-S1: Small right foraminal protrusion. Bulky right greater than left facet arthrosis. Mild narrowing of the right lateral recess with possible contact on the right S1 descending nerve.   IMPRESSION:  Mild to moderate multilevel lumbar spondylosis, most notably at L4-L5 where there is grade I anterolisthesis, moderate spinal canal stenosis and mild-to-moderate bilateral foraminal narrowing.  --- End of Report ---  EXAM: [id number] - MR SPINE CERVICAL  - ORDERED BY: THEODORE KILGORE   PROCEDURE DATE:  01/02/2025    INTERPRETATION:  MR CERVICAL SPINE  CLINICAL INFORMATION: eval for spinal compression; TECHNIQUE: MR CERVICAL SPINE COMPARISON: None available  FINDINGS:  DISC LEVEL EVALUATION:  C1/C2: Dorsal ligamentous thickening which mildly indents the ventral thecal sac without central canal narrowing. C2/C3: Mild right facet arthrosis. Small central protrusion which indents the ventral thecal sac. C3/C4: Mild disc bulge with osseous ridging which mildly indents the ventral thecal sac. Mild narrowing of the left lateral recess. Mild left facet arthrosis. Moderate left foraminal narrowing. C4/C5: Mild disc bulge. Bulky right facet arthrosis. Moderate right greater than left foraminal narrowing. C5/C6: Mild disc bulge which indents the ventral thecal sac. Mild to moderate bilateral foraminal narrowing. C6/C7: Disc bulge asymmetric to the right. Moderate right foraminal narrowing. C7/T1: Normal.  ALIGNMENT: Exaggerated cervical lordosis. Mild retrolisthesis of C3 on C4. Mild anterolisthesis of C4 on C5. CORD: No cord signal abnormality. MARROW: No fracture. Mild patchy diffuse marrow signal hypointensity which may be red marrow conversion. DISCS: Moderate multilevel loss of disc heights and disc signal. IMAGED BRAIN: Normal. PERIPHERAL/NECK SOFT TISSUES: Subcentimeter nodule in the left lobe of the thyroid gland.  IMPRESSION:  *  Exaggerated cervical lordosis with multilevel cervical spondylosis with multilevel mild to moderate spinal canal and varying foraminal narrowing. *  No cord signal abnormality.  --- End of Report ---

## 2025-02-10 NOTE — ADDENDUM
[FreeTextEntry1] : This note was written by Quan Mccallum on 02/10/2025 acting as scribe for Dr. Jabier Arrington M.D.  I, Jabier Arrington MD, have read and attest that all the information, medical decision making and discharge instructions within are true and accurate.

## 2025-02-10 NOTE — CONSULT LETTER
[Dear  ___] : Dear  [unfilled], [Courtesy Letter:] : I had the pleasure of seeing your patient, [unfilled], in my office today. [Please see my note below.] : Please see my note below. [Consult Closing:] : Thank you very much for allowing me to participate in the care of this patient.  If you have any questions, please do not hesitate to contact me. [Sincerely,] : Sincerely, [FreeTextEntry2] : Dr. Del Toro  [FreeTextEntry3] : Jabier Arrington MD

## 2025-02-10 NOTE — DISCUSSION/SUMMARY
[Medication Risks Reviewed] : Medication risks reviewed [Surgical risks reviewed] : Surgical risks reviewed [de-identified] : L4-5 spondylolisthesis with moderate stenosis. Discussed all options. Discussed L4-5 laminectomy and fusion. Sees pain management. Risks of surgery include infection, dural tear, nerve root injury, reherniation, future leg pain, future back pain, retained fragment, hematoma, urinary retention, worsening leg symptoms, foot drop, anesthetic risks, blood transfusion risks, positioning pain, visceral vascular injury, deep vein thrombosis, pulmonary embolus, and death. Somatosensory evoked potentials and EMG monitoring will be used. Surgery will involve lumbar decompression and fusion with or without instrumentation, local auto bone fusion, demineralized bone matrix, and neural monitoring. Patient will need spinal orthosis pre and post operatively. All risks were explained not exclusive to the ones mentioned alternatives were discussed and all questions were answered the patient agrees and understands the above and is in complete agreement with the plan. F/U 1 month.  All options discussed including rest, medicine, home exercise, acupuncture, Chiropractic care, Physical Therapy, Pain management, and last resort surgery. All questions were answered, all alternatives discussed, and the patient is in complete agreement with the treatment plan which the patient contributed to and discussed with me through the shared decision-making process. Follow-up appointment as instructed. Any issues and the patient will call or come in sooner.

## 2025-02-10 NOTE — HISTORY OF PRESENT ILLNESS
[Worsening] : worsening [de-identified] : 66 year old female presents for follow up evaluation of lower back pain with radiation down the LLE x 2-3 years. Denies injury. The pain radiates down the LLE laterally to the knee. Denies numbness/tingling. Denies weakness. Bending and walking aggravates the pain.  She takes gabapentin for the pain. Has tried PT about 1 year ago which did not help. S/P LESI x 2 on 8/12/24 with Dr. Valdes and on 10/25/24 with Dr. Del Toro but did not feel relief.  S/P LESI on 1/17/25 with Dr. Del Toro, she notes she did not feel improvement.  Has MRI Lumbar.  PMHx: HTN, COPD No fever, chills, sweats, nausea/vomiting. No bowel or bladder dysfunction, no recent weight loss or gain. No night pain. This history is in addition to the intake form that I personally reviewed.

## 2025-03-13 NOTE — HISTORY OF PRESENT ILLNESS
[FreeTextEntry1] : Referred by: Dr. Del Toro (VINAY)   Chief Complaint: low back pain   History of Present Illness: NOEMI is a 67 year old female with a history of COPD, HTN who presents with low back pain. Patient    Onset/Context of pain: Pain location: lower back Quality: sharp, stabbing Radiation: across the lower back into lateral leg L>R Severity: 10/10 on NRS Onset: over 3 months ago Associated symptoms: stiffness, spasms Numbness: denies Weakness: admits to high levels of falling Exacerbated by: walking, standing Improved by: nothing Bowel or bladder involvement: denies   Red flag symptoms: Bowel and bladder: No loss of control  Saddle anesthesia: Denies Weakness: Denies   Pertinent History: h/o Thrombocytopenia/bleeding tendency/platelet dysfunction: no h/o Liver disease/abnormal liver function: no h/o Chronic kidney disease (CKD)/abnormal kidney function: no Patient on dialysis: no h/o Diabetes: no Anticoagulation/Aspirin/Supplements: no   Current Pain Medications: Gabapentin 300mg TID Naproxen  Medrol dose sterling (improved) Tizanidine 2mg    Past Pain Medications: N/A   Conservative Treatment: PT: completed course of PT   History of Relevant Surgeries: N/A   Injection History (date, procedure, %improvement): 8/12/24 - LESI L5-S1 w/Dr. aVldes (no improvement) 10/25/24 - B/L SIJ injection w/Dr. Del Toro (no improvement) 1/17/25 - LESI L L5-S1 w/Dr. Del Toro (no improvement)   Social History: Alcohol: occasional Tobacco: former; quit >1year ago Recreational drug use: no Work: She is a retired .    Chart review: Today I have reviewed available medical information in the patient's medical record, including relevant provider notes, laboratory work, and imaging.   Historical Review: I have reviewed the patient's past medical, surgical, social, and family history available in the EMR at this time along with supplemented information provided by the patient during the interview process today.   Medications and list of allergies: Medications and allergies were reviewed, reconciled and updated in the electronic medical record.

## 2025-03-13 NOTE — HISTORY OF PRESENT ILLNESS
[FreeTextEntry1] : Referred by: Dr. Del Toro (VINAY)   Chief Complaint: low back pain   History of Present Illness: NOEMI is a 67 year old female with a history of COPD, HTN who presents with low back pain. Patient    Onset/Context of pain: Pain location: lower back Quality: sharp, stabbing Radiation: across the lower back into lateral leg L>R Severity: 10/10 on NRS Onset: over 3 months ago Associated symptoms: stiffness, spasms Numbness: denies Weakness: admits to high levels of falling Exacerbated by: walking, standing Improved by: nothing Bowel or bladder involvement: denies   Red flag symptoms: Bowel and bladder: No loss of control  Saddle anesthesia: Denies Weakness: Denies   Pertinent History: h/o Thrombocytopenia/bleeding tendency/platelet dysfunction: no h/o Liver disease/abnormal liver function: no h/o Chronic kidney disease (CKD)/abnormal kidney function: no Patient on dialysis: no h/o Diabetes: no Anticoagulation/Aspirin/Supplements: no   Current Pain Medications: Gabapentin 300mg TID Naproxen  Medrol dose sterling (improved) Tizanidine 2mg    Past Pain Medications: N/A   Conservative Treatment: PT: completed course of PT   History of Relevant Surgeries: N/A   Injection History (date, procedure, %improvement): 8/12/24 - LESI L5-S1 w/Dr. Valdes (no improvement) 10/25/24 - B/L SIJ injection w/Dr. Del Toro (no improvement) 1/17/25 - LESI L L5-S1 w/Dr. Del Toro (no improvement)   Social History: Alcohol: occasional Tobacco: former; quit >1year ago Recreational drug use: no Work: She is a retired .    Chart review: Today I have reviewed available medical information in the patient's medical record, including relevant provider notes, laboratory work, and imaging.   Historical Review: I have reviewed the patient's past medical, surgical, social, and family history available in the EMR at this time along with supplemented information provided by the patient during the interview process today.   Medications and list of allergies: Medications and allergies were reviewed, reconciled and updated in the electronic medical record.

## 2025-03-13 NOTE — PHYSICAL EXAM
[FreeTextEntry1] : Physical Exam: Constitutional: In NAD, calm and cooperative HEENT: NCAT, Anicteric sclera Cardio: Extremities appear pink and well perfused, no peripheral edema Respiratory: Normal respiratory effort on room air, no accessory muscle use Skin: no rashes seen on exposed skin, no visible abrasions Psych: Normal affect, intact judgment and insight Neuro: Awake, alert and oriented, see below for focused neurological exam   MSK (Back) Inspection: no gross swelling identified Palpation: Tenderness of the bilateral lumbar paraspinals, midline spine ROM: limited at end lumbar extension/flexion Strength: 5/5 strength in bilateral lower extremities Reflexes: 2+ Patella reflex bilaterally, 1+ Achilles reflex bilaterally, negative clonus bilaterally Sensation: Intact to light touch in bilateral lower extremities   Special tests: Seated slump test: negative bilaterally MIMA: negative bilaterally FADIR: negative bilaterally Facet Loading: negative bilaterally

## 2025-03-13 NOTE — DATA REVIEWED
[MRI] : MRI [FreeTextEntry1] : MRI cervical spine 1/2/25: DISC LEVEL EVALUATION:  C1/C2: Dorsal ligamentous thickening which mildly indents the ventral thecal sac without central canal narrowing. C2/C3: Mild right facet arthrosis. Small central protrusion which indents the ventral thecal sac. C3/C4: Mild disc bulge with osseous ridging which mildly indents the ventral thecal sac. Mild narrowing of the left lateral recess. Mild left facet arthrosis. Moderate left foraminal narrowing. C4/C5: Mild disc bulge. Bulky right facet arthrosis. Moderate right greater than left foraminal narrowing. C5/C6: Mild disc bulge which indents the ventral thecal sac. Mild to moderate bilateral foraminal narrowing. C6/C7: Disc bulge asymmetric to the right. Moderate right foraminal narrowing. C7/T1: Normal.  ALIGNMENT: Exaggerated cervical lordosis. Mild retrolisthesis of C3 on C4. Mild anterolisthesis of C4 on C5. CORD: No cord signal abnormality. MARROW: No fracture. Mild patchy diffuse marrow signal hypointensity which may be red marrow conversion. DISCS: Moderate multilevel loss of disc heights and disc signal. IMAGED BRAIN: Normal. PERIPHERAL/NECK SOFT TISSUES: Subcentimeter nodule in the left lobe of the thyroid gland.  IMPRESSION:  *  Exaggerated cervical lordosis with multilevel cervical spondylosis with multilevel mild to moderate spinal canal and varying foraminal narrowing. *  No cord signal abnormality.  MRI lumbar spine 1/2/25: INDIVIDUAL LEVELS: Multilevel loss of disc heights and disc signal.  LOWER THORACIC SPINE: Mild disc bulge T12-L1 mildly narrowing the neuroforamina.  L1-L2: Mild bilateral facet arthrosis. Mild bilateral foraminal narrowing. L2-L3: Mild disc bulge. Mild to moderate bilateral foraminal narrowing. L3-L4: Diffuse disc bulge. Bilateral facet arthrosis. Mild spinal canal stenosis. Mild narrowing of the right lateral recess. Mild to moderate right greater than left foraminal narrowing. L4-L5: Grade I anterolisthesis with disc uncovering. Diffuse disc bulge. Small right foraminal annular fissure. Bilateral facet arthrosis with effusions. Ligamentum flavum thickening. Moderate spinal canal stenosis. Mild to moderate bilateral foraminal narrowing. L5-S1: Small right foraminal protrusion. Bulky right greater than left facet arthrosis. Mild narrowing of the right lateral recess with possible contact on the right S1 descending nerve.   IMPRESSION:  Mild to moderate multilevel lumbar spondylosis, most notably at L4-L5 where there is grade I anterolisthesis, moderate spinal canal stenosis and mild-to-moderate bilateral foraminal narrowing.

## 2025-03-13 NOTE — ASSESSMENT
[FreeTextEntry1] : Ms. NOEMI BOLAÑOS is a 66 year F with pain in the lower back across with radiation down the buttock due to lumbar radiculopathy. There ARE +myelopathic signs on exam in previous office visits in B/L UE.   Plan/Recommendations:   We reviewed etiology, predisposing factor(s), natural course, imaging results as well as treatment options including medications, physical therapy/exercise, therapeutic injections, and surgery. The risks, consequences, alternatives, and benefits of various treatment options were discussed with the patient in great detail. We have discussed and recommended the following:   - Medications: Continue Tizanidine 2mg BID PRN pain. Discussed side effects including sedation. Patient denies any liver problems. Monitor LFTs. Patient warned about the sedative nature of this medication and recommended not to drive or to handle heavy machinery.  Stop Naproxen and start Meloxicam 15mg daily PRN. Recommend that if patient develops GI symptoms including abdominal pain, nausea, or vomiting to discontinue use of medication immediately. Discussed at length to wait until she has completed Medrol dose sterling prior to starting Meloxicam.   - Imaging: MRI cervical and lumbar spine reviewed.   - Physical therapy/modalities/DME: Continue HEP.   - Interventional/Surgical procedures: She has discussed L4-5 laminectomy and fusion with Dr. Arrington but lives alone and has limited social support for herself and her pets after surgery. May consider L4-5 LESI given lateral leg symptoms.    - Referrals: None indicated at this time.   - Activity: Continue activity as tolerated.   - Education: Cauda equina and associated symptoms, including motor weakness, bowel/bladder dysfunction, and perineal numbness were discussed. The patient was instructed to report to the Emergency department, if these symptoms occur.   - Follow-up: 1 month or sooner PRN

## 2025-04-17 NOTE — HISTORY OF PRESENT ILLNESS
[FreeTextEntry1] : INTERVAL HISTORY: Patient has on and off pain in low back, worsening in the evening. Pain worsens with activity. She notes that she is no longer having radicular symptoms although she notes midback pain now. She takes Tizanidine at night which helps. She is on Gabapentin 300/300/600 which has been helpful but she notes afternoon drowsiness. She takes Meloxicam in the afternoon which has also been helpful.  Referred by: Dr. Del Toro ([facility]INAY)   Chief Complaint: low back pain   History of Present Illness: NOEMI is a 67 year old female with a history of COPD, HTN who presents with low back pain.    Onset/Context of pain: Pain location: lower back Quality: sharp, stabbing Radiation: across the lower back into lateral leg L>R Severity: 10/10 on NRS Onset: over 3 months ago Associated symptoms: stiffness, spasms Numbness: denies Weakness: admits to high levels of falling Exacerbated by: walking, standing Improved by: nothing Bowel or bladder involvement: denies   Red flag symptoms: Bowel and bladder: No loss of control  Saddle anesthesia: Denies Weakness: Denies   Pertinent History: h/o Thrombocytopenia/bleeding tendency/platelet dysfunction: no h/o Liver disease/abnormal liver function: no h/o Chronic kidney disease (CKD)/abnormal kidney function: no Patient on dialysis: no h/o Diabetes: no Anticoagulation/Aspirin/Supplements: no   Current Pain Medications: Gabapentin 300/300/600 Meloxicam Medrol dose sterling (improved) Tizanidine 2mg    Past Pain Medications: N/A   Conservative Treatment: PT: completed course of PT   History of Relevant Surgeries: N/A   Injection History (date, procedure, %improvement): 8/12/24 - LESI L5-S1 w/Dr. Valdes (no improvement) 10/25/24 - B/L SIJ injection w/Dr. Del Toro (no improvement) 1/17/25 - LESI L L5-S1 w/Dr. Del Toro (no improvement)   Social History: Alcohol: occasional Tobacco: former; quit >1year ago Recreational drug use: no Work: She is a retired .    Chart review: Today I have reviewed available medical information in the patient's medical record, including relevant provider notes, laboratory work, and imaging.   Historical Review: I have reviewed the patient's past medical, surgical, social, and family history available in the EMR at this time along with supplemented information provided by the patient during the interview process today.   Medications and list of allergies: Medications and allergies were reviewed, reconciled and updated in the electronic medical record.

## 2025-04-17 NOTE — PHYSICAL EXAM
[FreeTextEntry1] : Physical Exam: Constitutional: In NAD, calm and cooperative HEENT: NCAT, Anicteric sclera Cardio: Extremities appear pink and well perfused, no peripheral edema Respiratory: Normal respiratory effort on room air, no accessory muscle use Skin: no rashes seen on exposed skin, no visible abrasions Psych: Normal affect, intact judgment and insight Neuro: Awake, alert and oriented, see below for focused neurological exam   MSK (Back) Inspection: no gross swelling identified Palpation: Tenderness of the bilateral thoracic and lumbar paraspinals, midline spine ROM: limited at end lumbar extension/flexion Strength: 5/5 strength in bilateral lower extremities Reflexes: 2+ Patella reflex bilaterally, 1+ Achilles reflex bilaterally, negative clonus bilaterally Sensation: Intact to light touch in bilateral lower extremities   Special tests: Seated slump test: negative bilaterally MIMA: negative bilaterally FADIR: negative bilaterally Facet Loading: negative bilaterally

## 2025-04-17 NOTE — ASSESSMENT
[FreeTextEntry1] : Ms. NOEMI BOLAÑOS is a 66 year F with pain in the lower back across with radiation down the buttock due to lumbar radiculopathy. There ARE +myelopathic signs on exam in previous office visits in B/L UE.   Plan/Recommendations:   We reviewed etiology, predisposing factor(s), natural course, imaging results as well as treatment options including medications, physical therapy/exercise, therapeutic injections, and surgery. The risks, consequences, alternatives, and benefits of various treatment options were discussed with the patient in great detail. We have discussed and recommended the following:   - Medications: Continue Tizanidine 2mg BID PRN pain. Discussed side effects including sedation. Patient denies any liver problems. Monitor LFTs. Patient warned about the sedative nature of this medication and recommended not to drive or to handle heavy machinery.  Continue Meloxicam 15mg daily PRN. Recommend that if patient develops GI symptoms including abdominal pain, nausea, or vomiting to discontinue use of medication immediately.   Given afternoon sedation, discussed 1 week trial of decreasing Gabapentin from 300/300/600 to 300/600 to improve sedation and monitor pain levels.   - Imaging: MRI cervical and lumbar spine reviewed.   - Physical therapy/modalities/DME: Start PT. Given new Rx.   - Interventional/Surgical procedures: She has discussed L4-5 laminectomy and fusion with Dr. Arrington but lives alone and has limited social support for herself and her pets after surgery.    - Referrals: None indicated at this time.   - Activity: Continue activity as tolerated.   - Education: Cauda equina and associated symptoms, including motor weakness, bowel/bladder dysfunction, and perineal numbness were discussed. The patient was instructed to report to the Emergency department, if these symptoms occur.   - Follow-up: 6-8 weeks or sooner PRN

## 2025-06-19 NOTE — PHYSICAL EXAM
[FreeTextEntry1] : Physical Exam: Constitutional: In NAD, calm and cooperative HEENT: NCAT, Anicteric sclera Cardio: Extremities appear pink and well perfused, no peripheral edema Respiratory: Normal respiratory effort on room air, no accessory muscle use Skin: no rashes seen on exposed skin, no visible abrasions Psych: Normal affect, intact judgment and insight Neuro: Awake, alert and oriented, see below for focused neurological exam   MSK (Back) Inspection: no gross swelling identified Palpation: no tenderness of the bilateral thoracic and lumbar paraspinals, midline spine ROM: limited at end lumbar extension/flexion Strength: 5/5 strength in bilateral lower extremities Reflexes: 2+ Patella reflex bilaterally, 1+ Achilles reflex bilaterally, negative clonus bilaterally Sensation: Intact to light touch in bilateral lower extremities   Special tests: Seated slump test: negative bilaterally MIMA: negative bilaterally FADIR: negative bilaterally Facet Loading: negative bilaterally

## 2025-06-19 NOTE — HISTORY OF PRESENT ILLNESS
[FreeTextEntry1] : INTERVAL HISTORY: She tried holding afternoon dose of Gabapentin but noticed worsening pain and did not notice improvement in sedation. She restarted the afternoon dose. She has noticed that she is more awake now. She continues to note pain in low back, no radiating pain. Meloxicam helps with the pain.   INTERVAL HISTORY 4/17/25: Patient has on and off pain in low back, worsening in the evening. Pain worsens with activity. She notes that she is no longer having radicular symptoms although she notes midback pain now. She takes Tizanidine at night which helps. She is on Gabapentin 300/300/600 which has been helpful but she notes afternoon drowsiness. She takes Meloxicam in the afternoon which has also been helpful.  Referred by: Dr. Del Toro (VINAY)   Chief Complaint: low back pain   History of Present Illness: NOEMI is a 67 year old female with a history of COPD, HTN who presents with low back pain.    Onset/Context of pain: Pain location: lower back Quality: sharp, stabbing Radiation: across the lower back into lateral leg L>R Severity: 10/10 on NRS Onset: over 3 months ago Associated symptoms: stiffness, spasms Numbness: denies Weakness: admits to high levels of falling Exacerbated by: walking, standing Improved by: nothing Bowel or bladder involvement: denies   Red flag symptoms: Bowel and bladder: No loss of control  Saddle anesthesia: Denies Weakness: Denies   Pertinent History: h/o Thrombocytopenia/bleeding tendency/platelet dysfunction: no h/o Liver disease/abnormal liver function: no h/o Chronic kidney disease (CKD)/abnormal kidney function: no Patient on dialysis: no h/o Diabetes: no Anticoagulation/Aspirin/Supplements: no   Current Pain Medications: Gabapentin 300/300/600 Meloxicam Medrol dose sterling (improved) Tizanidine 2mg    Past Pain Medications: N/A   Conservative Treatment: PT: completed course of PT   History of Relevant Surgeries: N/A   Injection History (date, procedure, %improvement): 8/12/24 - LESI L5-S1 w/Dr. Valdes (no improvement) 10/25/24 - B/L SIJ injection w/Dr. Del Toro (no improvement) 1/17/25 - LESI L L5-S1 w/Dr. Del Toro (no improvement)   Social History: Alcohol: occasional Tobacco: former; quit >1year ago Recreational drug use: no Work: She is a retired .    Chart review: Today I have reviewed available medical information in the patient's medical record, including relevant provider notes, laboratory work, and imaging.   Historical Review: I have reviewed the patient's past medical, surgical, social, and family history available in the EMR at this time along with supplemented information provided by the patient during the interview process today.   Medications and list of allergies: Medications and allergies were reviewed, reconciled and updated in the electronic medical record.

## 2025-06-19 NOTE — ASSESSMENT
[FreeTextEntry1] : Ms. NOEMI BOLAÑOS is a 66 year F with pain in the lower back across with radiation down the buttock due to lumbar radiculopathy. There ARE +myelopathic signs on exam in previous office visits in B/L UE.   Plan/Recommendations:   We reviewed etiology, predisposing factor(s), natural course, imaging results as well as treatment options including medications, physical therapy/exercise, therapeutic injections, and surgery. The risks, consequences, alternatives, and benefits of various treatment options were discussed with the patient in great detail. We have discussed and recommended the following:   - Medications: Continue Tizanidine 2mg BID PRN pain. Discussed side effects including sedation. Patient denies any liver problems. Monitor LFTs. Patient warned about the sedative nature of this medication and recommended not to drive or to handle heavy machinery. Patient will have LFTs checked with PCP next visit.   Switch Meloxicam to 7.5mg BID PRN. Recommend that if patient develops GI symptoms including abdominal pain, nausea, or vomiting to discontinue use of medication immediately.   Continue Gabapentin 300/300/600. Discussed side effects including sedation.   - Imaging: MRI cervical and lumbar spine reviewed.   - Physical therapy/modalities/DME: Continue PT.    - Interventional/Surgical procedures: She has discussed L4-5 laminectomy and fusion with Dr. Arrington but lives alone and has limited social support for herself and her pets after surgery.   May discuss injections if continued pain despite medications.    - Referrals: None indicated at this time.   - Activity: Continue activity as tolerated.   - Education: Cauda equina and associated symptoms, including motor weakness, bowel/bladder dysfunction, and perineal numbness were discussed. The patient was instructed to report to the Emergency department, if these symptoms occur.   - Follow-up: 6-8 weeks or sooner PRN

## 2025-06-19 NOTE — DATA REVIEWED
[FreeTextEntry1] : MRI cervical spine 1/2/25: DISC LEVEL EVALUATION:  C1/C2: Dorsal ligamentous thickening which mildly indents the ventral thecal sac without central canal narrowing. C2/C3: Mild right facet arthrosis. Small central protrusion which indents the ventral thecal sac. C3/C4: Mild disc bulge with osseous ridging which mildly indents the ventral thecal sac. Mild narrowing of the left lateral recess. Mild left facet arthrosis. Moderate left foraminal narrowing. C4/C5: Mild disc bulge. Bulky right facet arthrosis. Moderate right greater than left foraminal narrowing. C5/C6: Mild disc bulge which indents the ventral thecal sac. Mild to moderate bilateral foraminal narrowing. C6/C7: Disc bulge asymmetric to the right. Moderate right foraminal narrowing. C7/T1: Normal.  ALIGNMENT: Exaggerated cervical lordosis. Mild retrolisthesis of C3 on C4. Mild anterolisthesis of C4 on C5. CORD: No cord signal abnormality. MARROW: No fracture. Mild patchy diffuse marrow signal hypointensity which may be red marrow conversion. DISCS: Moderate multilevel loss of disc heights and disc signal. IMAGED BRAIN: Normal. PERIPHERAL/NECK SOFT TISSUES: Subcentimeter nodule in the left lobe of the thyroid gland.  IMPRESSION:  *  Exaggerated cervical lordosis with multilevel cervical spondylosis with multilevel mild to moderate spinal canal and varying foraminal narrowing. *  No cord signal abnormality.  MRI lumbar spine 1/2/25: INDIVIDUAL LEVELS: Multilevel loss of disc heights and disc signal.  LOWER THORACIC SPINE: Mild disc bulge T12-L1 mildly narrowing the neuroforamina.  L1-L2: Mild bilateral facet arthrosis. Mild bilateral foraminal narrowing. L2-L3: Mild disc bulge. Mild to moderate bilateral foraminal narrowing. L3-L4: Diffuse disc bulge. Bilateral facet arthrosis. Mild spinal canal stenosis. Mild narrowing of the right lateral recess. Mild to moderate right greater than left foraminal narrowing. L4-L5: Grade I anterolisthesis with disc uncovering. Diffuse disc bulge. Small right foraminal annular fissure. Bilateral facet arthrosis with effusions. Ligamentum flavum thickening. Moderate spinal canal stenosis. Mild to moderate bilateral foraminal narrowing. L5-S1: Small right foraminal protrusion. Bulky right greater than left facet arthrosis. Mild narrowing of the right lateral recess with possible contact on the right S1 descending nerve.   IMPRESSION:  Mild to moderate multilevel lumbar spondylosis, most notably at L4-L5 where there is grade I anterolisthesis, moderate spinal canal stenosis and mild-to-moderate bilateral foraminal narrowing.

## 2025-06-19 NOTE — HISTORY OF PRESENT ILLNESS
[FreeTextEntry1] : INTERVAL HISTORY: She tried holding afternoon dose of Gabapentin but noticed worsening pain and did not notice improvement in sedation. She restarted the afternoon dose. She has noticed that she is more awake now. She continues to note pain in low back, no radiating pain. Meloxicam helps with the pain.   INTERVAL HISTORY 4/17/25: Patient has on and off pain in low back, worsening in the evening. Pain worsens with activity. She notes that she is no longer having radicular symptoms although she notes midback pain now. She takes Tizanidine at night which helps. She is on Gabapentin 300/300/600 which has been helpful but she notes afternoon drowsiness. She takes Meloxicam in the afternoon which has also been helpful.  Referred by: Dr. Del Toro ([facility])   Chief Complaint: low back pain   History of Present Illness: NOEMI is a 67 year old female with a history of COPD, HTN who presents with low back pain.    Onset/Context of pain: Pain location: lower back Quality: sharp, stabbing Radiation: across the lower back into lateral leg L>R Severity: 10/10 on NRS Onset: over 3 months ago Associated symptoms: stiffness, spasms Numbness: denies Weakness: admits to high levels of falling Exacerbated by: walking, standing Improved by: nothing Bowel or bladder involvement: denies   Red flag symptoms: Bowel and bladder: No loss of control  Saddle anesthesia: Denies Weakness: Denies   Pertinent History: h/o Thrombocytopenia/bleeding tendency/platelet dysfunction: no h/o Liver disease/abnormal liver function: no h/o Chronic kidney disease (CKD)/abnormal kidney function: no Patient on dialysis: no h/o Diabetes: no Anticoagulation/Aspirin/Supplements: no   Current Pain Medications: Gabapentin 300/300/600 Meloxicam Medrol dose sterling (improved) Tizanidine 2mg    Past Pain Medications: N/A   Conservative Treatment: PT: completed course of PT   History of Relevant Surgeries: N/A   Injection History (date, procedure, %improvement): 8/12/24 - LESI L5-S1 w/Dr. Valdse (no improvement) 10/25/24 - B/L SIJ injection w/Dr. Del Toro (no improvement) 1/17/25 - LESI L L5-S1 w/Dr. Del Toro (no improvement)   Social History: Alcohol: occasional Tobacco: former; quit >1year ago Recreational drug use: no Work: She is a retired .    Chart review: Today I have reviewed available medical information in the patient's medical record, including relevant provider notes, laboratory work, and imaging.   Historical Review: I have reviewed the patient's past medical, surgical, social, and family history available in the EMR at this time along with supplemented information provided by the patient during the interview process today.   Medications and list of allergies: Medications and allergies were reviewed, reconciled and updated in the electronic medical record.

## 2025-06-19 NOTE — ASSESSMENT
[FreeTextEntry1] : Ms. NOEMI BOLAÑOS is a 66 year F with pain in the lower back across with radiation down the buttock due to lumbar radiculopathy. There ARE +myelopathic signs on exam in previous office visits in [facility].   Plan/Recommendations:   We reviewed etiology, predisposing factor(s), natural course, imaging results as well as treatment options including medications, physical therapy/exercise, therapeutic injections, and surgery. The risks, consequences, alternatives, and benefits of various treatment options were discussed with the patient in great detail. We have discussed and recommended the following:   - Medications: Continue Tizanidine 2mg BID PRN pain. Discussed side effects including sedation. Patient denies any liver problems. Monitor LFTs. Patient warned about the sedative nature of this medication and recommended not to drive or to handle heavy machinery. Patient will have LFTs checked with PCP next visit.   Switch Meloxicam to 7.5mg BID PRN. Recommend that if patient develops GI symptoms including abdominal pain, nausea, or vomiting to discontinue use of medication immediately.   Continue Gabapentin 300/300/600. Discussed side effects including sedation.   - Imaging: MRI cervical and lumbar spine reviewed.   - Physical therapy/modalities/DME: Continue PT.    - Interventional/Surgical procedures: She has discussed L4-5 laminectomy and fusion with Dr. Arrington but lives alone and has limited social support for herself and her pets after surgery.   May discuss injections if continued pain despite medications.    - Referrals: None indicated at this time.   - Activity: Continue activity as tolerated.   - Education: Cauda equina and associated symptoms, including motor weakness, bowel/bladder dysfunction, and perineal numbness were discussed. The patient was instructed to report to the Emergency department, if these symptoms occur.   - Follow-up: 6-8 weeks or sooner PRN      rolls from supine to side lying with mod A; sit to stand with total A 2/2 height of crib; short distance fx mobility from crib to mother's lap with min A;